# Patient Record
Sex: MALE
[De-identification: names, ages, dates, MRNs, and addresses within clinical notes are randomized per-mention and may not be internally consistent; named-entity substitution may affect disease eponyms.]

---

## 2020-02-12 PROBLEM — Z00.00 ENCOUNTER FOR PREVENTIVE HEALTH EXAMINATION: Status: ACTIVE | Noted: 2020-02-12

## 2020-02-13 ENCOUNTER — APPOINTMENT (OUTPATIENT)
Dept: OTOLARYNGOLOGY | Facility: CLINIC | Age: 45
End: 2020-02-13
Payer: COMMERCIAL

## 2020-02-13 VITALS
SYSTOLIC BLOOD PRESSURE: 142 MMHG | DIASTOLIC BLOOD PRESSURE: 79 MMHG | WEIGHT: 175 LBS | HEART RATE: 75 BPM | HEIGHT: 72 IN | BODY MASS INDEX: 23.7 KG/M2

## 2020-02-13 DIAGNOSIS — H93.291 OTHER ABNORMAL AUDITORY PERCEPTIONS, RIGHT EAR: ICD-10-CM

## 2020-02-13 DIAGNOSIS — H90.3 SENSORINEURAL HEARING LOSS, BILATERAL: ICD-10-CM

## 2020-02-13 DIAGNOSIS — H92.01 OTALGIA, RIGHT EAR: ICD-10-CM

## 2020-02-13 PROCEDURE — 92504 EAR MICROSCOPY EXAMINATION: CPT

## 2020-02-13 PROCEDURE — 92567 TYMPANOMETRY: CPT

## 2020-02-13 PROCEDURE — 92557 COMPREHENSIVE HEARING TEST: CPT

## 2020-02-13 PROCEDURE — 99203 OFFICE O/P NEW LOW 30 MIN: CPT | Mod: 25

## 2020-02-13 RX ORDER — ATORVASTATIN CALCIUM 80 MG/1
TABLET, FILM COATED ORAL
Refills: 0 | Status: ACTIVE | COMMUNITY

## 2020-02-13 NOTE — ASSESSMENT
[FreeTextEntry1] : He has had right ear discomfort and a clicking sensation for the past few weeks. It improved a few days ago after using ear drops. He had a small piece of hair in the external auditory canal which was removed. His ear exam is otherwise normal. Audiogram is also normal\par \par PLAN\par \par -findings and management options discussed in detail with the patient. \par -good aural hygiene\par -avoid using cotton swabs in the ears\par -wax removal drops as needed. \par -noise precautions\par -annual audiogram as needed\par -Since his symptoms have improved, we will see how he does over the next 1-2 weeks. If he has persistent or recurrent symptoms or other concerns, he should return. \par

## 2020-02-13 NOTE — HISTORY OF PRESENT ILLNESS
[de-identified] : JAVIER KAUR is a 44 year patient With a history of right ear discomfort for the past few weeks. He also felt a clicking in the ear. He had no otorrhea or dizziness. He used eardrops. He last had noise about 4 days ago. He has not been there for a few years. He denies a history of recurrent ear infections, prior otologic surgery, or noise exposure. he has no nasal or throat symptoms. He is otherwise doing well

## 2020-02-13 NOTE — CONSULT LETTER
[Consult Letter:] : I had the pleasure of evaluating your patient, [unfilled]. [Dear  ___] : Dear  [unfilled], [Please see my note below.] : Please see my note below. [Consult Closing:] : Thank you very much for allowing me to participate in the care of this patient.  If you have any questions, please do not hesitate to contact me. [FreeTextEntry3] : Mamie Duran MD\par  [Sincerely,] : Sincerely,

## 2021-02-19 ENCOUNTER — TRANSCRIPTION ENCOUNTER (OUTPATIENT)
Age: 46
End: 2021-02-19

## 2021-02-20 ENCOUNTER — TRANSCRIPTION ENCOUNTER (OUTPATIENT)
Age: 46
End: 2021-02-20

## 2021-03-10 ENCOUNTER — TRANSCRIPTION ENCOUNTER (OUTPATIENT)
Age: 46
End: 2021-03-10

## 2021-03-23 ENCOUNTER — TRANSCRIPTION ENCOUNTER (OUTPATIENT)
Age: 46
End: 2021-03-23

## 2021-04-06 ENCOUNTER — TRANSCRIPTION ENCOUNTER (OUTPATIENT)
Age: 46
End: 2021-04-06

## 2021-04-26 ENCOUNTER — TRANSCRIPTION ENCOUNTER (OUTPATIENT)
Age: 46
End: 2021-04-26

## 2021-09-22 ENCOUNTER — TRANSCRIPTION ENCOUNTER (OUTPATIENT)
Age: 46
End: 2021-09-22

## 2021-10-17 ENCOUNTER — TRANSCRIPTION ENCOUNTER (OUTPATIENT)
Age: 46
End: 2021-10-17

## 2022-03-24 ENCOUNTER — APPOINTMENT (OUTPATIENT)
Dept: OTOLARYNGOLOGY | Facility: CLINIC | Age: 47
End: 2022-03-24
Payer: COMMERCIAL

## 2022-03-24 VITALS — TEMPERATURE: 97 F | HEIGHT: 72 IN | BODY MASS INDEX: 23.7 KG/M2 | WEIGHT: 175 LBS

## 2022-03-24 DIAGNOSIS — H93.299 OTHER ABNORMAL AUDITORY PERCEPTIONS, UNSPECIFIED EAR: ICD-10-CM

## 2022-03-24 DIAGNOSIS — H93.13 TINNITUS, BILATERAL: ICD-10-CM

## 2022-03-24 PROCEDURE — 92550 TYMPANOMETRY & REFLEX THRESH: CPT

## 2022-03-24 PROCEDURE — 92557 COMPREHENSIVE HEARING TEST: CPT

## 2022-03-24 PROCEDURE — 99213 OFFICE O/P EST LOW 20 MIN: CPT

## 2022-03-24 NOTE — CONSULT LETTER
[Dear  ___] : Dear  [unfilled], [Consult Letter:] : I had the pleasure of evaluating your patient, [unfilled]. [Please see my note below.] : Please see my note below. [Consult Closing:] : Thank you very much for allowing me to participate in the care of this patient.  If you have any questions, please do not hesitate to contact me. [Sincerely,] : Sincerely, [FreeTextEntry3] : Mamie Duran MD\par

## 2022-03-24 NOTE — ASSESSMENT
[FreeTextEntry1] : He has bilateral tinnitus.  He occasionally has louder tinnitus on the right side.  His ears were normal on exam.  Audiogram was also normal.\par \par PLAN\par \par - findings and management options discussed with the patient. \par - Good aural hygiene.\par - noise precautions\par - repeat audiogram in 1 year\par - literature regarding tinnitus given\par - He may consider tinnitus therapy-\par - They may use a white noise maker or leave the tv/radio on when it is quiet \par -Consider further work-up if he has persistent asymmetric tinnitus.  However, he only reports intermittent louder tinnitus in the right ear that lasted few seconds\par - follow up in 1 year if doing well\par - call and return earlier if any concerns or persistent/worsening symptoms

## 2022-03-24 NOTE — HISTORY OF PRESENT ILLNESS
[de-identified] : JAVIER KAUR is a 46 year old patient Here for tinnitus.  He said he has mild bilateral tinnitus at night.  He describes a slight hissing.  He also has occasional louder tinnitus in the right ear which occurs intermittently and last for few seconds.  He has no otalgia, otorrhea, or dizziness.  I saw him in February 2028 for right ear pain.  That resolved.  He has no nasal or throat symptoms\par \par No history of recurrent ear infections or prior otologic surgery

## 2022-04-14 ENCOUNTER — TRANSCRIPTION ENCOUNTER (OUTPATIENT)
Age: 47
End: 2022-04-14

## 2022-08-16 ENCOUNTER — NON-APPOINTMENT (OUTPATIENT)
Age: 47
End: 2022-08-16

## 2024-07-20 ENCOUNTER — NON-APPOINTMENT (OUTPATIENT)
Age: 49
End: 2024-07-20

## 2025-03-21 ENCOUNTER — NON-APPOINTMENT (OUTPATIENT)
Age: 50
End: 2025-03-21